# Patient Record
Sex: MALE | Race: WHITE | URBAN - METROPOLITAN AREA
[De-identification: names, ages, dates, MRNs, and addresses within clinical notes are randomized per-mention and may not be internally consistent; named-entity substitution may affect disease eponyms.]

---

## 2019-02-20 ENCOUNTER — HISTORICAL (OUTPATIENT)
Dept: PREADMISSION TESTING | Facility: HOSPITAL | Age: 64
End: 2019-02-20

## 2019-02-20 LAB
ABS NEUT (OLG): 3.36 X10(3)/MCL (ref 2.1–9.2)
BASOPHILS # BLD AUTO: 0.1 X10(3)/MCL (ref 0–0.2)
BASOPHILS NFR BLD AUTO: 1 %
BUN SERPL-MCNC: 13 MG/DL (ref 7–18)
CALCIUM SERPL-MCNC: 8.8 MG/DL (ref 8.5–10.1)
CHLORIDE SERPL-SCNC: 105 MMOL/L (ref 98–107)
CO2 SERPL-SCNC: 26 MMOL/L (ref 21–32)
CREAT SERPL-MCNC: 0.99 MG/DL (ref 0.7–1.3)
CREAT/UREA NIT SERPL: 13.1
EOSINOPHIL # BLD AUTO: 0.2 X10(3)/MCL (ref 0–0.9)
EOSINOPHIL NFR BLD AUTO: 4 %
ERYTHROCYTE [DISTWIDTH] IN BLOOD BY AUTOMATED COUNT: 12.2 % (ref 11.5–17)
GLUCOSE SERPL-MCNC: 78 MG/DL (ref 74–106)
HCT VFR BLD AUTO: 47.8 % (ref 42–52)
HGB BLD-MCNC: 16.1 GM/DL (ref 14–18)
LYMPHOCYTES # BLD AUTO: 1.4 X10(3)/MCL (ref 0.6–4.6)
LYMPHOCYTES NFR BLD AUTO: 24 %
MCH RBC QN AUTO: 32.1 PG (ref 27–31)
MCHC RBC AUTO-ENTMCNC: 33.7 GM/DL (ref 33–36)
MCV RBC AUTO: 95.4 FL (ref 80–94)
MONOCYTES # BLD AUTO: 0.7 X10(3)/MCL (ref 0.1–1.3)
MONOCYTES NFR BLD AUTO: 12 %
NEUTROPHILS # BLD AUTO: 3.36 X10(3)/MCL (ref 2.1–9.2)
NEUTROPHILS NFR BLD AUTO: 59 %
PLATELET # BLD AUTO: 185 X10(3)/MCL (ref 130–400)
PMV BLD AUTO: 10.5 FL (ref 9.4–12.4)
POTASSIUM SERPL-SCNC: 4.2 MMOL/L (ref 3.5–5.1)
RBC # BLD AUTO: 5.01 X10(6)/MCL (ref 4.7–6.1)
SODIUM SERPL-SCNC: 139 MMOL/L (ref 136–145)
WBC # SPEC AUTO: 5.7 X10(3)/MCL (ref 4.5–11.5)

## 2019-03-18 ENCOUNTER — HISTORICAL (OUTPATIENT)
Dept: SURGERY | Facility: HOSPITAL | Age: 64
End: 2019-03-18

## 2020-01-27 ENCOUNTER — HISTORICAL (OUTPATIENT)
Dept: SURGERY | Facility: HOSPITAL | Age: 65
End: 2020-01-27

## 2021-08-03 ENCOUNTER — HISTORICAL (OUTPATIENT)
Dept: INFECTIOUS DISEASES | Facility: HOSPITAL | Age: 66
End: 2021-08-03

## 2022-04-29 NOTE — OP NOTE
DATE OF SURGERY:    01/27/2020    SURGEON:  Dillan Ventura MD    PREOPERATIVE DIAGNOSIS:  Screening.    POSTOPERATIVE DIAGNOSIS:  Colon polyps.    PROCEDURE:  Colonoscopy with biopsy.    ANESTHESIA:  Propofol.    ESTIMATED BLOOD LOSS:  Less than 10 cc.    SPECIMENS:    1. Descending colon.  2. Rectosigmoid.    COMPLICATIONS:  None.    PROCEDURE IN DETAIL:  After informed consent was obtained, patient was brought to the operating room.  Continuous EKG, pulse oximetry, and intermittent blood pressure monitoring were utilized.  The patient was placed in the left lateral decubitus position and propofol was administered by the anesthesia team.  A digital rectal exam was performed and no mass or gross blood was identified.  Colonoscope was inserted into the rectum, insufflated and gently advanced in a retrograde fashion to the level of the cecum.  The ileocecal valve and appendiceal orifice were identified.  Photos were taken.  Colonoscope was slowly retracted and the colonic mucosa examined in a 360-degree fashion.  A small 2-3 mm polyp was identified in the proximal descending colon.  A photo was taken.  Biopsy was performed and the polyp was completely removed.  It was sent for permanent pathology.  The colonoscope was further retracted and a 3 mm polyp was identified in the rectosigmoid.  A photo was taken.  Biopsy was performed and sent for permanent pathology.  Colonoscope was retracted into the rectum and retroflexed.  A single enlarged internal hemorrhoid was noted.  A photo was taken.  Colonoscope was returned to the neutral position and completely removed from the patient.  He tolerated the procedure well.  There were no complications.  Subsequently, he was transferred to Recovery in satisfactory condition.      PLAN:  Due to the colon polyps, recommend repeat colonoscopy in 5 years.        ______________________________  MD CORNELIUS Soriano/  DD:  01/27/2020  Time:  11:37AM  DT:  01/27/2020   Time:  11:56AM  Job #:  [id number]

## 2022-04-29 NOTE — OP NOTE
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    PROCEDURE:  Fistulotomy.    ANESTHESIA:  General.    ESTIMATED BLOOD LOSS:  Less than 10 cc.    SPECIMENS:  None.    COMPLICATIONS:  None.    PROCEDURE IN DETAIL:  After informed consent was obtained, the patient was brought to the operating room and placed in the supine position.  Next, general endotracheal anesthesia was administered by member of the anesthesia team.  The patient was placed in the prone jackknife position.  Buttocks were taped apart for exposure.  The perianal skin was prepped and draped in sterile surgical fashion.  A medium Hill-Braun retractor was inserted and all 4 quadrants inspected.  No anorectal pathology was seen.  The patient had a posterior perianal opening.  A small lacrimal probe was then placed through the external opening and a small droplet of purulence was noted from the internal opening.  The probe was then passed through the internal opening thus delineating the fistula.  It involved a small amount of sphincter, so fistulotomy was performed with electrocautery.  Hemostasis was achieved with spot cautery.  The edges of the fistulotomy site were then marsupialized with running locking 3-0 Vicryl suture.  Upon completion, Marcaine 0.25% with epinephrine was used for local anesthesia.  A tongue of gauze was placed within the fistulotomy site covered with dry gauze secured with tape.  The patient tolerated the procedure well.  There     were no complications.  He was returned to the supine position.  He was awakened and extubated in the operating room then subsequently transferred to recovery in satisfactory condition.      ______________________________  Dillan Ventura MD    /  DD:  03/18/2019  Time:  11:00AM  DT:  03/18/2019  Time:  11:20AM  Job #:  [id number]